# Patient Record
Sex: FEMALE | Race: WHITE | NOT HISPANIC OR LATINO | Employment: OTHER | URBAN - METROPOLITAN AREA
[De-identification: names, ages, dates, MRNs, and addresses within clinical notes are randomized per-mention and may not be internally consistent; named-entity substitution may affect disease eponyms.]

---

## 2021-05-27 ENCOUNTER — NURSING HOME VISIT (OUTPATIENT)
Dept: GERIATRICS | Facility: OTHER | Age: 86
End: 2021-05-27
Payer: MEDICARE

## 2021-05-27 DIAGNOSIS — E11.65 TYPE 2 DIABETES MELLITUS WITH HYPERGLYCEMIA, WITH LONG-TERM CURRENT USE OF INSULIN (HCC): ICD-10-CM

## 2021-05-27 DIAGNOSIS — E66.9 OBESITY WITH SERIOUS COMORBIDITY, UNSPECIFIED CLASSIFICATION, UNSPECIFIED OBESITY TYPE: ICD-10-CM

## 2021-05-27 DIAGNOSIS — E04.2 MULTIPLE THYROID NODULES: ICD-10-CM

## 2021-05-27 DIAGNOSIS — R53.81 DEBILITY: ICD-10-CM

## 2021-05-27 DIAGNOSIS — D62 ACUTE BLOOD LOSS ANEMIA: ICD-10-CM

## 2021-05-27 DIAGNOSIS — I50.32 CHRONIC DIASTOLIC CHF (CONGESTIVE HEART FAILURE) (HCC): ICD-10-CM

## 2021-05-27 DIAGNOSIS — I25.10 CORONARY ARTERY DISEASE INVOLVING NATIVE CORONARY ARTERY OF NATIVE HEART WITHOUT ANGINA PECTORIS: ICD-10-CM

## 2021-05-27 DIAGNOSIS — S72.001D CLOSED FRACTURE OF RIGHT HIP WITH ROUTINE HEALING, SUBSEQUENT ENCOUNTER: Primary | ICD-10-CM

## 2021-05-27 DIAGNOSIS — N18.30 STAGE 3 CHRONIC KIDNEY DISEASE, UNSPECIFIED WHETHER STAGE 3A OR 3B CKD (HCC): ICD-10-CM

## 2021-05-27 DIAGNOSIS — I48.91 ATRIAL FIBRILLATION, UNSPECIFIED TYPE (HCC): ICD-10-CM

## 2021-05-27 DIAGNOSIS — Z79.4 TYPE 2 DIABETES MELLITUS WITH HYPERGLYCEMIA, WITH LONG-TERM CURRENT USE OF INSULIN (HCC): ICD-10-CM

## 2021-05-27 PROBLEM — S72.001A CLOSED RIGHT HIP FRACTURE (HCC): Status: ACTIVE | Noted: 2021-05-15

## 2021-05-27 PROCEDURE — 99306 1ST NF CARE HIGH MDM 50: CPT | Performed by: FAMILY MEDICINE

## 2021-05-27 RX ORDER — OXYCODONE HYDROCHLORIDE 5 MG/1
5 TABLET ORAL EVERY 6 HOURS PRN
Qty: 30 TABLET | Refills: 0 | Status: SHIPPED | OUTPATIENT
Start: 2021-05-27 | End: 2021-07-17

## 2021-05-27 NOTE — PROGRESS NOTES
Betty [address]  History and Physical  POS: SNF-31    Records Reviewed include: [facility] records  Unable to obtain from patient due to: History obtained from patient  Additional history obtained speaking with nursing/nursing note review and medical record review    Chief Complaint/ Reason for Admission: Right hip fracture s/p ORIF, DM2 with hyperglycemia, ABLA s/p PRBC transfusion    History of Present Illness:            80year old female admitted for SNF rehab following hospitalization at [facility], [facility]  Presented following a fall with right hip fracture  Underwent ORIF per orthopedics on 5/16/21  Postoperative course complicated by acute blood loss anemia with hypovolemic shock requiring PRBC transfusion and pressor support  Hgb 9 8 when last checked inpatient; patient without dizziness or lightheadedness reported  Has CKD3 with baseline creatinine of approximately 1 00; no MED noted per lab review during hospitalization in setting of above- peak creatinine of 1 15  Continues with pain in her right hip, exacerbated with movement and working with physical therapy; relieved with rest  Additional active medical issues include chronic diastolic CHF for which patient is on lasix and metoprolol; along with CAD with recent SWETHA to LAD placed in December 2020; continues on plavix, statin  Also with DM2 on insulin; with noted hyperglycemia since SNF admission per blood glucose log review in SNF EMR  Thyroid nodules were noted incidentally on inpatient trauma imaging         Allergies  No Known Allergies    Past Medical History  Past Medical History:   Diagnosis Date    A-fib [facility])     Chronic kidney disease     Coronary artery disease     Diabetes mellitus ([facility] [address] )     Hypertension       CHF baseline [id number]%, grade 1 diastolic dysfunction (4/12/31)  CKD: Stage 3, baseline creatinine 1 00    Past Surgical History:   Procedure Laterality Date    COLON SURGERY      HIP SURGERY         Family History  Family History   Problem Relation Age of Onset    Heart attack Brother        Social History  Social History     Tobacco Use   Smoking Status Never Smoker   Smokeless Tobacco Never Used      Social History     Substance and Sexual Activity   Alcohol Use Never      Social History     Substance and Sexual Activity   Drug Use Not on file        Lives: Assisted Living,- [facility]  Social Support: Facility  Fall in the past 12 months: Yes    Physical Exam    Weight: 171 4lb Temp:97 7F BP:148/80 Pulse:79 Resp:18 O2 Sat:97% NC  Constitutional: Well-nourished, Obese and Normocephalic  Orientation:Person and Place, situation     Physical Exam  Vitals and nursing note reviewed  Constitutional:       General: She is awake  She is not in acute distress  Appearance: She is well-developed  She is obese  She is not ill-appearing, toxic-appearing or diaphoretic  HENT:      Head: Normocephalic and atraumatic  Right Ear: External ear normal       Left Ear: External ear normal       Nose: No rhinorrhea  Mouth/Throat:      Mouth: Mucous membranes are moist       Pharynx: Oropharynx is clear  Eyes:      General: No scleral icterus  Right eye: No discharge  Left eye: No discharge  Conjunctiva/sclera: Conjunctivae normal    Cardiovascular:      Rate and Rhythm: Normal rate  Rhythm irregular  Pulmonary:      Effort: Pulmonary effort is normal  No respiratory distress  Breath sounds: No wheezing  Comments: Decreased breath sounds b/l bases  Abdominal:      General: There is no distension  Palpations: Abdomen is soft  Tenderness: There is no abdominal tenderness  Musculoskeletal:         General: No deformity  Cervical back: No rigidity  Right lower le+ Edema present  Left lower le+ Edema present  Skin:     General: Skin is warm and dry  Coloration: Skin is not jaundiced or pale  Neurological:      General: No focal deficit present  Mental Status: She is alert  Cranial Nerves: No cranial nerve deficit  Psychiatric:         Attention and Perception: Attention and perception normal          Mood and Affect: Mood and affect normal          Speech: Speech normal          Behavior: Behavior normal  Behavior is cooperative  Thought Content: Thought content normal          Review of Systems:  Review of Systems   Constitutional: Negative for chills and fever  Respiratory: Negative for cough and shortness of breath  Cardiovascular: Negative for chest pain  Gastrointestinal: Negative for abdominal pain and constipation  Musculoskeletal: Positive for gait problem  Neurological: Negative for dizziness and light-headedness  Psychiatric/Behavioral: Negative for sleep disturbance  All other systems reviewed and are negative        List of Current Medications: Medication list reviewed and updated in Epic to reflect most current SNF orders    Allergies  No Known Allergies    Labs/Diagnostics (reviewed by this provider): Hospital Paperwork and Old Records  HbA1c: (5/16/21) 8 6  CBC: (5/24/21) Hb:9 8 Hct:29 1 WBC:6 8 PLT:212  BMP: (5/24/21) Na:142 K:3 8 Cl:109 CO:29 BUN:21 Cr:0 87 Glu:170 Ca:8 9  Cardiac: [id number]     Imaging Reviewed:  EKG: (5/16/21)- Afib  CT Scan: Head (5/15/21)- chronic right centrum semiovale lacunar infarct  Cervical/Thoracic/Lumbar spine (5/15/21)- no fracture; multilevel degenerative changes  Other: Xray R femur (5/17/21)- s/p intramedullary rodding of right intertrochanteric femur fracture    Assessment/Plan:  80year old female with:    Closed right hip fracture (HCC)  S/p ORIF on 5/16/21  Start scheduled acetaminophen 650mg TID for pain control  Continue OxyIR PRN- add 5mg Q6h PRN for severe pain; continue 2 5mg Q6h PRN for moderate pain  Continue eliquis for DVT prophylaxis (takes chronically as anticoagulation in setting of Afib)  Routine orthopedics follow up  Consult PT/OT- evaluate and treat    Acute blood loss anemia  In setting of above; requiring PRBC transfusion inpatient due to hypovolemic shock  Follow up CBC ordered    Type 2 diabetes mellitus, with long-term current use of insulin ([facility])    Lab Results   Component Value Date    HGBA1C 8 6 (H) 05/16/2021   With hyperglycemia per review of glucose log in SNF EMR  Patient's lantus was not restarted at time of hospital discharge- Restart lantus 20u SQ daily (previously on 30u daily outpatient)  Continue novolog with meals: 3u breakfast, 8u lunch, 14u dinner  Continue to monitor accuchecks QID    CKD (chronic kidney disease) stage 3, GFR 30-59 ml/min ([facility])  Baseline creatinine 1 00  At risk for MED in setting of above  Follow up BMP ordered    Chronic diastolic CHF (congestive heart failure) ([facility] [address] )  Examines euvolemic at time of SNF admission aside for 1+ lower extremity edema  Monitor weights and volume status closely  Continue lasix + KCl, metoprolol    Coronary artery disease involving native coronary artery  S/p SWETHA to LAD December 2020  Stable; continue plavix, metoprolol, rosuvastatin     A-fib ([facility])  Continue metoprolol; goal HR<100  Continue anticoagulation with eliquis    Multiple thyroid nodules  Noted incidentally on inpatient imaging  Outpatient follow up with PCP with recommendation for dedicated thyroid ultrasound    Obesity  Dietary/nutrition consult    Debility  Multifactorial  Admit to SNF for rehab  PT/OT consult- evaluate and treat  Supportive care, nutritional support, ADL support  Fall precautions  Management of acute and chronic medical conditions as outlined      Pain: Present yes- right hip with activity   Rehab Potential:Fair  Patient Informed of Medical Condition: yes  Patient is Capable of Understanding Their Right: yes  Prognosis:Fair  Discharge Plan: STR-> [facility]  Surrogate Decision Maker:  Advanced Directives: Yes   Code status:DNR (Per discussion with resident or POA)   Code status order updated to DNR/DNI while at SNF  PCP: Kay Archuleta, DO  5/27/21

## 2021-06-02 ENCOUNTER — NURSING HOME VISIT (OUTPATIENT)
Dept: GERIATRICS | Facility: OTHER | Age: 86
End: 2021-06-02
Payer: MEDICARE

## 2021-06-02 DIAGNOSIS — E87.6 HYPOKALEMIA: ICD-10-CM

## 2021-06-02 DIAGNOSIS — Z79.4 TYPE 2 DIABETES MELLITUS WITH HYPERGLYCEMIA, WITH LONG-TERM CURRENT USE OF INSULIN (HCC): ICD-10-CM

## 2021-06-02 DIAGNOSIS — R53.81 DEBILITY: ICD-10-CM

## 2021-06-02 DIAGNOSIS — E11.65 TYPE 2 DIABETES MELLITUS WITH HYPERGLYCEMIA, WITH LONG-TERM CURRENT USE OF INSULIN (HCC): ICD-10-CM

## 2021-06-02 DIAGNOSIS — S72.001D CLOSED FRACTURE OF RIGHT HIP WITH ROUTINE HEALING, SUBSEQUENT ENCOUNTER: Primary | ICD-10-CM

## 2021-06-02 DIAGNOSIS — D62 ACUTE BLOOD LOSS ANEMIA: ICD-10-CM

## 2021-06-02 PROCEDURE — 99309 SBSQ NF CARE MODERATE MDM 30: CPT | Performed by: FAMILY MEDICINE

## 2021-06-03 NOTE — PROGRESS NOTES
[address] [facility]  Progress Note  POS: SNF-31    Unable to obtain from patient due to: History obtained from patient along with speaking with nursing/ nursing libertad review  Chief Complaint/Reason for visit: STR follow up  History of Present Illness: 80year old female evaluated for STR follow up  Blood glucose log reviewed per [facility] EMR; improved with addition of lantus but still with hyperglycemia  Right hip pain is improved; patient using PRN OxyIR sparingly  Continues on lasix for chronic diastolic CHF, noted to have low potassium of 3 2 per labs today  Past Medical History: unchanged from history and physical  Past Medical History:   Diagnosis Date    A-fib ([facility] )     Chronic kidney disease     Coronary artery disease     Diabetes mellitus ([facility] )     Hypertension      Family History: unchanged from history and physical  Social History: unchanged from history and physical  Review of systems: Review of Systems   Constitutional: Negative for chills, fever and unexpected weight change  Respiratory: Negative for cough and shortness of breath  Gastrointestinal: Negative for constipation and diarrhea  Musculoskeletal: Negative for arthralgias  Neurological: Negative for dizziness and light-headedness  All other systems reviewed and are negative  Medications: Changes made- see written orders  Medication list reviewed and updated in Epic to reflect most current SNF orders  Allergies: NKDA  Consults reviewed:PT and OT  Labs/Diagnostics (reviewed by this provider): Copy in Chart  CBC: (6/2/21) Hb:10 0 Hct:29 9 WBC:12 6 PLT:236  BMP: (6/2/21) Na:140 K:3 2 Cl:96 CO:34 BUN:19 Cr:0 86 Glu:172 Ca:8 9    Physical Exam    Weight: 171 4lb Temp:97 2F BP:136/74  Pulse:80 Resp:20 O2 Sat:95%RA  Constitutional: Well-nourished, Obese and Normocephalic  Orientation:Person and Place     Physical Exam  Vitals and nursing note reviewed  Constitutional:       General: She is awake   She is not in acute distress  Appearance: She is well-developed and well-groomed  She is obese  She is not ill-appearing, toxic-appearing or diaphoretic  HENT:      Head: Normocephalic and atraumatic  Right Ear: External ear normal       Left Ear: External ear normal       Nose: No rhinorrhea  Mouth/Throat:      Mouth: Mucous membranes are moist       Pharynx: Oropharynx is clear  Eyes:      General: No scleral icterus  Right eye: No discharge  Left eye: No discharge  Conjunctiva/sclera: Conjunctivae normal    Pulmonary:      Effort: Pulmonary effort is normal  No respiratory distress  Breath sounds: No wheezing  Abdominal:      General: There is no distension  Palpations: Abdomen is soft  Musculoskeletal:         General: No deformity  Cervical back: No rigidity  Right lower leg: Edema present  Left lower leg: Edema present  Skin:     General: Skin is warm and dry  Coloration: Skin is not jaundiced or pale  Neurological:      General: No focal deficit present  Mental Status: She is alert  Mental status is at baseline  Cranial Nerves: No cranial nerve deficit  Psychiatric:         Attention and Perception: Attention and perception normal          Mood and Affect: Mood and affect normal          Speech: Speech normal          Behavior: Behavior normal  Behavior is cooperative         Assessment/Plan:  80year old female with:    Closed right hip fracture ([address] )  S/p ORIF on 5/16/21  Continue scheduled acetaminophen, PRN OxyIR for pain control  Continue eliquis for DVT prophylaxis  Routine orthopedics follow up  Continue PT/OT    Type 2 diabetes mellitus, with long-term current use of insulin (HCC)    Lab Results   Component Value Date    HGBA1C 8 6 (H) 05/16/2021   With continued hyperglycemia per review of glucose log in SNF EMR  Increase lantus to 22u SQ daily (previously on 30u daily outpatient)  Continue novolog with meals: 3u breakfast, 8u lunch, 14u dinner  Continue to monitor accuchecks QID    Hypokalemia  Supplementation with KCl 20meq daily  Order repeat BMP along with magnesium level    Acute blood loss anemia  Hgb stable/improved at 10 0  Mild leukocytosis noted- repeat CBC ordered    Debility  Multifactorial  Continue SNF rehab  Continue PT/OT   Supportive care, nutritional support, ADL support  Fall precautions  Management of acute and chronic medical conditions as outlined    Estella De Anda, DO  6/2/21

## 2021-06-09 ENCOUNTER — NURSING HOME VISIT (OUTPATIENT)
Dept: GERIATRICS | Facility: OTHER | Age: 86
End: 2021-06-09
Payer: MEDICARE

## 2021-06-09 DIAGNOSIS — E04.2 MULTIPLE THYROID NODULES: ICD-10-CM

## 2021-06-09 DIAGNOSIS — N18.30 STAGE 3 CHRONIC KIDNEY DISEASE, UNSPECIFIED WHETHER STAGE 3A OR 3B CKD (HCC): ICD-10-CM

## 2021-06-09 DIAGNOSIS — E11.65 TYPE 2 DIABETES MELLITUS WITH HYPERGLYCEMIA, WITH LONG-TERM CURRENT USE OF INSULIN (HCC): ICD-10-CM

## 2021-06-09 DIAGNOSIS — I25.10 CORONARY ARTERY DISEASE INVOLVING NATIVE CORONARY ARTERY OF NATIVE HEART WITHOUT ANGINA PECTORIS: ICD-10-CM

## 2021-06-09 DIAGNOSIS — Z79.4 TYPE 2 DIABETES MELLITUS WITH HYPERGLYCEMIA, WITH LONG-TERM CURRENT USE OF INSULIN (HCC): ICD-10-CM

## 2021-06-09 DIAGNOSIS — D62 ACUTE BLOOD LOSS ANEMIA: ICD-10-CM

## 2021-06-09 DIAGNOSIS — S72.001D CLOSED FRACTURE OF RIGHT HIP WITH ROUTINE HEALING, SUBSEQUENT ENCOUNTER: Primary | ICD-10-CM

## 2021-06-09 DIAGNOSIS — E83.42 HYPOMAGNESEMIA: ICD-10-CM

## 2021-06-09 DIAGNOSIS — I50.32 CHRONIC DIASTOLIC CHF (CONGESTIVE HEART FAILURE) (HCC): ICD-10-CM

## 2021-06-09 DIAGNOSIS — R53.81 DEBILITY: ICD-10-CM

## 2021-06-09 DIAGNOSIS — I48.91 ATRIAL FIBRILLATION, UNSPECIFIED TYPE (HCC): ICD-10-CM

## 2021-06-09 PROCEDURE — 99316 NF DSCHRG MGMT 30 MIN+: CPT | Performed by: FAMILY MEDICINE

## 2021-06-10 PROBLEM — E11.9 TYPE 2 DIABETES MELLITUS, WITH LONG-TERM CURRENT USE OF INSULIN (HCC): Status: ACTIVE | Noted: 2021-05-15

## 2021-06-10 PROBLEM — Z79.4 TYPE 2 DIABETES MELLITUS, WITH LONG-TERM CURRENT USE OF INSULIN (HCC): Status: ACTIVE | Noted: 2021-05-15

## 2021-06-10 PROBLEM — I25.10 CORONARY ARTERY DISEASE INVOLVING NATIVE CORONARY ARTERY: Status: ACTIVE | Noted: 2020-12-29

## 2021-06-10 PROBLEM — K21.9 GERD (GASTROESOPHAGEAL REFLUX DISEASE): Status: ACTIVE | Noted: 2021-05-15

## 2021-06-10 PROBLEM — I50.22 CHRONIC SYSTOLIC CHF (CONGESTIVE HEART FAILURE) (HCC): Status: ACTIVE | Noted: 2020-12-29

## 2021-06-10 PROBLEM — I48.91 A-FIB (HCC): Status: ACTIVE | Noted: 2021-05-15

## 2021-06-10 PROBLEM — N18.9 CKD (CHRONIC KIDNEY DISEASE): Status: ACTIVE | Noted: 2021-05-15

## 2021-06-10 NOTE — PROGRESS NOTES
[facility]  [address]  [phone]  [address] [place] [address]: [facility]  POS: 31: SNF/Short Term Rehab    NAME: Bijan Mclain  AGE: 80 y o  SEX: female  DATE OF ADMISSION: 5/25/21 DATE OF DISCHARGE:6/11/21 DISCHARGE DISPOSITION: [facility]    Reason for admission: Patient was admitted from [facility] for rehabilitation after hospitalization for right hip fracture s/p ORIF      Admission Diagnoses:   Closed right hip fracture ([address] )  S/p ORIF on 5/16/21  Continue scheduled acetaminophen, PRN OxyIR for pain control  Continue eliquis for DVT prophylaxis  Routine orthopedics follow up  Continue PT/OT at discharge; scripts faxed to [facility]    Hypomagnesemia  With associated hypokalemia- improved on supplementation with 20meq daily  Start magnesium oxide 250mg daily    Acute blood loss anemia  In setting of right hip fracture; requiring PRBC transfusion inpatient due to hypovolemic shock  CBC reviewed/improved (see below); recommend to recheck in 2 weeks outpatient    Type 2 diabetes mellitus, with long-term current use of insulin ([facility])    Lab Results   Component Value Date    HGBA1C 8 6 (H) 05/16/2021   Continue lantus 22u SQ daily (previously on 30u daily outpatient)  Continue novolog with meals: 3u breakfast, 8u lunch, 14u dinner  Continue to monitor accuchecks QID  Tradjenta discontinued    Chronic diastolic CHF (congestive heart failure) ([facility])  Examines euvolemic overall at time of SNF discharge exam  Continue to monitor weights and volume status closely  Continue lasix + KCl, metoprolol    A-fib ([facility])  Continue metoprolol; goal HR<100  Continue anticoagulation with eliquis    CKD (chronic kidney disease) stage 3, GFR 30-59 ml/min ([facility])  Baseline creatinine 1 00  Repeat BMP reviewed/stable (see below)    Multiple thyroid nodules  Noted incidentally on inpatient imaging  Outpatient follow up with PCP with recommendation for dedicated thyroid ultrasound    Coronary artery disease involving native coronary artery  S/p SWETHA to LAD December 2020  Stable; continue plavix, metoprolol, rosuvastatin     Debility  Multifactorial  Completed SNF rehab  Continue PT/OT through home health at discharge  Supportive care, nutritional support, ADL support  Fall precautions  Management of acute and chronic medical conditions as outlined    Additional Problems: See above  Discharge Diagnoses: See above    Course of stay: Patient was admitted [facility] due to right hip fracture s/p ORIF, ABLA  Significant events during the stay include: hypokalemia with associated hypomagnesemia- management as above; also with hyperglycemia, improved with management as above  The patient participated in PT/OT  Labs and testing performed during stay:  BMP (6/7/21): Na 140 K 3 5 Cl 101 CO2 32 BUN 22 Creat 0 83 Glc 95 Ca 9 2 Mg 1 1  CBC (6/7/21): Hgb 11 4 Hct 34 8 WBC 6 3 Plt 248    New Medications:   -OxyIR 2 5mg Q6h PRN moderate pain, 5mg Q6h PRN severe pain  -Acetaminophen 650mg TID  -Magnesium oxide 250mg daily  Medication Changes:   -Lantus adjusted to 22u QHS  Discontinued Medications:  -Tradjenta  Discharge Medications: See discharge medication list which was reviewed and updated in Epic to reflect most current SNF orders  Status at time of discharge: Stable    Today's Visit: 6/9/21  Subjective:Patient evaluated for STR follow up and discharge visit; scheduled to return to [facility] on 6/11/21  Patient's right hip pain has improved; with intermittent use of PRN OxyIR with good relief  Blood sugars reviewed per SNF EMR and improved overall  Potassium level improved to 3 5; patient noted to have low Mg level of 1 1  Vitals: 171 4lb 76 136/74 97 2F 20    Exam: Physical Exam  Vitals and nursing note reviewed  Constitutional:       General: She is awake  She is not in acute distress       Appearance: She is well-developed and well-groomed  She is obese  She is not ill-appearing, toxic-appearing or diaphoretic  HENT:      Head: Normocephalic and atraumatic  Right Ear: External ear normal       Left Ear: External ear normal       Nose: No rhinorrhea  Mouth/Throat:      Mouth: Mucous membranes are moist       Pharynx: Oropharynx is clear  Eyes:      General: No scleral icterus  Right eye: No discharge  Left eye: No discharge  Conjunctiva/sclera: Conjunctivae normal    Pulmonary:      Effort: Pulmonary effort is normal  No respiratory distress  Abdominal:      General: There is no distension  Palpations: Abdomen is soft  Musculoskeletal:         General: No deformity  Cervical back: No rigidity  Right lower leg: No edema  Left lower leg: No edema  Skin:     General: Skin is warm and dry  Coloration: Skin is not jaundiced or pale  Neurological:      General: No focal deficit present  Mental Status: She is alert  Mental status is at baseline  Cranial Nerves: No cranial nerve deficit  Psychiatric:         Attention and Perception: Attention and perception normal          Mood and Affect: Mood and affect normal          Speech: Speech normal          Behavior: Behavior normal  Behavior is cooperative  Thought Content: Thought content normal          Discussion with patient/family and further instructions:  -Fall precautions  -Bleeding precautions  -Medication list was reviewed and updated in Epic to reflect most current SNF orders  -Scripts for PT/OT were faxed to facility    Follow-up Recommendations: Please follow-up with your primary care physician within 7-10 days of discharge to review medication changes and current status  Discharge summary forwarded to PCP through Epic      Problem List Follow-up Recommendations:  -See above    I have spent 35 minutes with Patient  today in which greater than 50% of this time was spent in counseling/coordination of care regarding Diagnostic results, Intructions for management, Patient and family education, Risk factor reductions, Impressions and coordination of discharge care with nursing and social work      [address] [facility], DO  6/9/21

## 2021-06-11 RX ORDER — POTASSIUM CHLORIDE 1.5 G/1.77G
20 POWDER, FOR SOLUTION ORAL DAILY
COMMUNITY
End: 2021-07-17

## 2021-06-11 RX ORDER — PANTOPRAZOLE SODIUM 40 MG/1
40 TABLET, DELAYED RELEASE ORAL DAILY
COMMUNITY
Start: 2021-05-26 | End: 2022-05-26

## 2021-06-11 RX ORDER — AMOXICILLIN 250 MG
1 CAPSULE ORAL 2 TIMES DAILY
COMMUNITY
Start: 2021-05-25 | End: 2022-05-25

## 2021-06-11 RX ORDER — FUROSEMIDE 40 MG/1
40 TABLET ORAL DAILY
COMMUNITY
Start: 2020-12-25 | End: 2022-05-25

## 2021-06-11 RX ORDER — ACETAMINOPHEN 500 MG
500 TABLET ORAL 3 TIMES DAILY
COMMUNITY

## 2021-06-11 RX ORDER — ROSUVASTATIN CALCIUM 20 MG/1
20 TABLET, COATED ORAL DAILY
COMMUNITY
Start: 2020-12-24 | End: 2022-05-25

## 2021-06-11 RX ORDER — CLOPIDOGREL BISULFATE 75 MG/1
75 TABLET ORAL DAILY
COMMUNITY
Start: 2020-12-25

## 2021-06-11 RX ORDER — METOPROLOL SUCCINATE 25 MG/1
75 TABLET, EXTENDED RELEASE ORAL DAILY
COMMUNITY
Start: 2020-12-25 | End: 2022-05-25

## 2021-06-11 RX ORDER — INSULIN ASPART 100 [IU]/ML
3-10 INJECTION, SOLUTION INTRAVENOUS; SUBCUTANEOUS
COMMUNITY

## 2021-06-17 PROBLEM — E83.42 HYPOMAGNESEMIA: Status: ACTIVE | Noted: 2021-06-17

## 2021-06-17 PROBLEM — N18.30 CKD (CHRONIC KIDNEY DISEASE) STAGE 3, GFR 30-59 ML/MIN (HCC): Status: ACTIVE | Noted: 2021-05-15

## 2021-06-17 PROBLEM — R53.81 DEBILITY: Status: ACTIVE | Noted: 2021-06-17

## 2021-06-17 PROBLEM — E66.9 OBESITY: Status: ACTIVE | Noted: 2021-06-17

## 2021-06-17 PROBLEM — D62 ACUTE BLOOD LOSS ANEMIA: Status: ACTIVE | Noted: 2021-06-17

## 2021-06-17 PROBLEM — E87.6 HYPOKALEMIA: Status: ACTIVE | Noted: 2021-06-17

## 2021-06-17 PROBLEM — I50.32 CHRONIC DIASTOLIC CHF (CONGESTIVE HEART FAILURE) (HCC): Status: ACTIVE | Noted: 2020-12-29

## 2021-06-17 PROBLEM — E04.2 MULTIPLE THYROID NODULES: Status: ACTIVE | Noted: 2021-06-17

## 2021-06-17 RX ORDER — MULTIVITAMIN/IRON/FOLIC ACID 18MG-0.4MG
250 TABLET ORAL DAILY
Refills: 0
Start: 2021-06-17

## 2021-06-17 NOTE — ASSESSMENT & PLAN NOTE
With associated hypokalemia- improved on supplementation with 20meq daily  Start magnesium oxide 250mg daily

## 2021-06-17 NOTE — ASSESSMENT & PLAN NOTE
Examines euvolemic at time of SNF admission aside for 1+ lower extremity edema  Monitor weights and volume status closely  Continue lasix + KCl, metoprolol

## 2021-06-17 NOTE — ASSESSMENT & PLAN NOTE
Lab Results   Component Value Date    HGBA1C 8 6 (H) 05/16/2021   With continued hyperglycemia per review of glucose log in SNF EMR  Increase lantus to 22u SQ daily (previously on 30u daily outpatient)  Continue novolog with meals: 3u breakfast, 8u lunch, 14u dinner  Continue to monitor accuchecks QID

## 2021-06-17 NOTE — ASSESSMENT & PLAN NOTE
Multifactorial  Continue SNF rehab  Continue PT/OT   Supportive care, nutritional support, ADL support  Fall precautions  Management of acute and chronic medical conditions as outlined

## 2021-06-17 NOTE — ASSESSMENT & PLAN NOTE
In setting of right hip fracture; requiring PRBC transfusion inpatient due to hypovolemic shock  CBC reviewed/improved (see below); recommend to recheck in 2 weeks outpatient

## 2021-06-17 NOTE — ASSESSMENT & PLAN NOTE
Examines euvolemic overall at time of SNF discharge exam  Continue to monitor weights and volume status closely  Continue lasix + KCl, metoprolol

## 2021-06-17 NOTE — ASSESSMENT & PLAN NOTE
Lab Results   Component Value Date    HGBA1C 8 6 (H) 05/16/2021   Continue lantus 22u SQ daily (previously on 30u daily outpatient)  Continue novolog with meals: 3u breakfast, 8u lunch, 14u dinner  Continue to monitor accuchecks QID  Tradjenta discontinued

## 2021-06-17 NOTE — ASSESSMENT & PLAN NOTE
S/p ORIF on 5/16/21  Continue scheduled acetaminophen, PRN OxyIR for pain control  Continue eliquis for DVT prophylaxis  Routine orthopedics follow up  Continue PT/OT at discharge; scripts faxed to [facility]

## 2021-06-17 NOTE — ASSESSMENT & PLAN NOTE
Multifactorial  Completed SNF rehab  Continue PT/OT through home health at discharge  Supportive care, nutritional support, ADL support  Fall precautions  Management of acute and chronic medical conditions as outlined

## 2021-06-17 NOTE — ASSESSMENT & PLAN NOTE
Noted incidentally on inpatient imaging  Outpatient follow up with PCP with recommendation for dedicated thyroid ultrasound

## 2021-06-17 NOTE — ASSESSMENT & PLAN NOTE
Lab Results   Component Value Date    HGBA1C 8 6 (H) 05/16/2021   With hyperglycemia per review of glucose log in SNF EMR  Patient's lantus was not restarted at time of hospital discharge- Restart lantus 20u SQ daily (previously on 30u daily outpatient)  Continue novolog with meals: 3u breakfast, 8u lunch, 14u dinner  Continue to monitor accuchecks QID

## 2021-06-17 NOTE — ASSESSMENT & PLAN NOTE
S/p ORIF on 5/16/21  Start scheduled acetaminophen 650mg TID for pain control  Continue OxyIR PRN- add 5mg Q6h PRN for severe pain; continue 2 5mg Q6h PRN for moderate pain  Continue eliquis for DVT prophylaxis (takes chronically as anticoagulation in setting of Afib)  Routine orthopedics follow up  Consult PT/OT- evaluate and treat

## 2021-06-17 NOTE — ASSESSMENT & PLAN NOTE
S/p ORIF on 5/16/21  Continue scheduled acetaminophen, PRN OxyIR for pain control  Continue eliquis for DVT prophylaxis  Routine orthopedics follow up  Continue PT/OT

## 2021-06-17 NOTE — ASSESSMENT & PLAN NOTE
In setting of above; requiring PRBC transfusion inpatient due to hypovolemic shock  Follow up CBC ordered

## 2021-06-24 ENCOUNTER — TELEPHONE (OUTPATIENT)
Dept: GERIATRICS | Age: 86
End: 2021-06-24

## 2021-06-24 NOTE — TELEPHONE ENCOUNTER
Spoke w/Genny at [facility] and she did not have insurance info, then she referred me to called [phone] spoke with Raven Junior on  06/17 and 06/21  I requested insurance information and she never sent it to me so I am self pay so patient or family will call to provide the insurance info

## 2021-07-07 ENCOUNTER — NURSING HOME VISIT (OUTPATIENT)
Dept: GERIATRICS | Facility: OTHER | Age: 86
End: 2021-07-07
Payer: MEDICARE

## 2021-07-07 DIAGNOSIS — I48.91 ATRIAL FIBRILLATION, UNSPECIFIED TYPE (HCC): ICD-10-CM

## 2021-07-07 DIAGNOSIS — N18.30 STAGE 3 CHRONIC KIDNEY DISEASE, UNSPECIFIED WHETHER STAGE 3A OR 3B CKD (HCC): ICD-10-CM

## 2021-07-07 DIAGNOSIS — E83.42 HYPOMAGNESEMIA: ICD-10-CM

## 2021-07-07 DIAGNOSIS — S72.001D CLOSED FRACTURE OF RIGHT HIP WITH ROUTINE HEALING, SUBSEQUENT ENCOUNTER: ICD-10-CM

## 2021-07-07 DIAGNOSIS — Z79.4 TYPE 2 DIABETES MELLITUS WITH HYPERGLYCEMIA, WITH LONG-TERM CURRENT USE OF INSULIN (HCC): ICD-10-CM

## 2021-07-07 DIAGNOSIS — D62 ACUTE BLOOD LOSS ANEMIA: ICD-10-CM

## 2021-07-07 DIAGNOSIS — I50.32 CHRONIC DIASTOLIC CHF (CONGESTIVE HEART FAILURE) (HCC): ICD-10-CM

## 2021-07-07 DIAGNOSIS — E11.65 TYPE 2 DIABETES MELLITUS WITH HYPERGLYCEMIA, WITH LONG-TERM CURRENT USE OF INSULIN (HCC): ICD-10-CM

## 2021-07-07 DIAGNOSIS — R53.81 DEBILITY: ICD-10-CM

## 2021-07-07 DIAGNOSIS — I25.10 CORONARY ARTERY DISEASE INVOLVING NATIVE CORONARY ARTERY OF NATIVE HEART WITHOUT ANGINA PECTORIS: ICD-10-CM

## 2021-07-07 DIAGNOSIS — T81.49XA POSTOPERATIVE WOUND INFECTION OF RIGHT HIP: Primary | ICD-10-CM

## 2021-07-07 PROCEDURE — 99306 1ST NF CARE HIGH MDM 50: CPT | Performed by: FAMILY MEDICINE

## 2021-07-07 RX ORDER — LOPERAMIDE HYDROCHLORIDE 2 MG/1
2 TABLET ORAL 4 TIMES DAILY PRN
COMMUNITY

## 2021-07-07 RX ORDER — DAPTOMYCIN 50 MG/ML
550 INJECTION, POWDER, LYOPHILIZED, FOR SOLUTION INTRAVENOUS EVERY 24 HOURS
COMMUNITY
Start: 2021-06-26 | End: 2021-07-24

## 2021-07-07 RX ORDER — POTASSIUM CHLORIDE 20 MEQ/1
20 TABLET, EXTENDED RELEASE ORAL DAILY
COMMUNITY

## 2021-07-07 RX ORDER — TRAMADOL HYDROCHLORIDE 50 MG/1
50 TABLET ORAL 2 TIMES DAILY PRN
Qty: 20 TABLET | Refills: 0 | Status: SHIPPED | OUTPATIENT
Start: 2021-07-07 | End: 2021-08-06

## 2021-07-07 RX ORDER — ACETAMINOPHEN 500 MG
1000 TABLET ORAL EVERY 8 HOURS
COMMUNITY
Start: 2021-06-19 | End: 2021-07-19

## 2021-07-07 RX ORDER — TRAMADOL HYDROCHLORIDE 50 MG/1
50 TABLET ORAL 2 TIMES DAILY PRN
COMMUNITY
Start: 2021-06-19 | End: 2021-07-07 | Stop reason: SDUPTHER

## 2021-07-08 NOTE — PROGRESS NOTES
[facility]  History and Physical  POS: SNF-31    Records Reviewed include: [facility] records  Unable to obtain from patient due to: History obtained from patient along with speaking with nursing/ nursing note review and medical record review    Chief Complaint/ Reason for Admission: Right hip postoperative wound infection    History of Present Illness:            80year old female admitted for SNF rehab following hospitalization at [facility] for postoperative wound infection of right hip; in setting of recent surgical repair for right hip fracture in May 2021  Imaging reveal fluid collection; evaluated by orthopedics and underwent washout; fluid cultures positive for staph aureus  Evaluated by infectious disease; ultimately placed on daptomycin IV to continue for a total of 4 weeks  Patient notes continued pain in her right hip; current pain regimen includes scheduled acetaminophen and PRN tramadol  Continues on lantus, novolog in addition to tradjenta for DM2; with hyperglycemia noted since SNF admission with sugars >400 noted per [facility] blood glucose log review  Continues on lasix +KCl, metoprolol, lisinopril for chronic diastolic CHF; no shortness of breath or increase in lower extremity edema reported  Previous hypokalemia and hypomagnesemia improved with oral supplementation         Allergies  No Known Allergies    Past Medical History  Past Medical History:   Diagnosis Date    [facility]-fib ([facility] )     Chronic kidney disease     Coronary artery disease     Diabetes mellitus ([facility] )     Hypertension       CHF baseline LB:99-38%, grade 1 diastolic dysfunction (7/7151)  CKD: Stage 3, baseline creatinine 1 00    Past Surgical History:   Procedure Laterality Date    COLON SURGERY      HIP SURGERY         Family History  Family History   Problem Relation Age of Onset    Heart attack Brother        Social History  Social History     Tobacco Use   Smoking Status Never Smoker   Smokeless Tobacco Never Used      Social History     Substance and Sexual Activity   Alcohol Use Never      Social History     Substance and Sexual Activity   Drug Use Not on file        Lives: Assisted Living,- [facility]  Social Support: Facility  Fall in the past 12 months: Yes    Physical Exam    Weight: 172lb Temp:96 9F BP:144/72 Pulse:80 Resp:18 O2 Sat:96%RA  Constitutional: Well-nourished, Obese and Normocephalic  Orientation:Person and Place, situation     Physical Exam  Vitals and nursing note reviewed  Constitutional:       General: She is awake  She is not in acute distress  Appearance: She is well-developed and well-groomed  She is obese  She is not ill-appearing, toxic-appearing or diaphoretic  HENT:      Head: Normocephalic and atraumatic  Right Ear: External ear normal       Left Ear: External ear normal       Nose: No rhinorrhea  Mouth/Throat:      Mouth: Mucous membranes are moist       Pharynx: Oropharynx is clear  Eyes:      General: No scleral icterus  Right eye: No discharge  Left eye: No discharge  Conjunctiva/sclera: Conjunctivae normal    Cardiovascular:      Rate and Rhythm: Normal rate  Rhythm irregular  Heart sounds: S1 normal and S2 normal       Comments: LUE PICC in place  Pulmonary:      Effort: Pulmonary effort is normal  No respiratory distress  Abdominal:      General: There is no distension  Palpations: Abdomen is soft  Musculoskeletal:         General: No deformity  Cervical back: No rigidity  Right lower leg: Edema present  Left lower leg: Edema present  Comments: Compression stockings in place b/l LEs   Skin:     General: Skin is warm and dry  Coloration: Skin is not jaundiced or pale  Neurological:      Mental Status: She is alert  Mental status is at baseline  Cranial Nerves: No dysarthria or facial asymmetry     Psychiatric:         Attention and Perception: Attention and perception normal          Mood and Affect: Mood and affect normal          Speech: Speech normal          Behavior: Behavior is cooperative  Review of Systems:  Review of Systems   Constitutional: Negative for chills and fever  Respiratory: Negative for cough and shortness of breath  Cardiovascular: Negative for leg swelling  Gastrointestinal: Negative for abdominal pain  Musculoskeletal: Positive for arthralgias  Skin: Positive for wound  Neurological: Negative for dizziness and light-headedness  All other systems reviewed and are negative        List of Current Medications: Medication list reviewed and updated in Epic to reflect most current SNF orders    Allergies  No Known Allergies    Labs/Diagnostics (reviewed by this provider): Hospital Paperwork and Old Records  CBC: (21) Hb:10 9 Hct:33 6 WBC:5 7 PLT:225  BMP: (21) Cr:1 13 Ma 4    Other: CK (21): 28 (83)    Microbiology:  Culture R hip (21)- Staph aureus    Imaging Reviewed:  CT Scan: R hip (21)- subcutaneous fluid density lateral aspect of hip    Assessment/Plan:  80year old female with:    Postoperative wound infection of right hip  Continue daptomycin; antibiotic management per ID, routine follow up as scheduled  Wound cultures inpatient +staph aureus  Weekly labs ordered while on IV antibiotics; monitor renal function closely    CKD (chronic kidney disease) stage 3, GFR 30-59 ml/min ([facility])  Baseline creatinine 1 00  Monitor creatinine weekly while on IV antibiotics, labs ordered    Acute blood loss anemia  In setting of recent right hip fracture s/p surgical repair  CBC ordered on admission reviewed and stable  Continue ferrous sulfate    Chronic diastolic CHF (congestive heart failure) ([facility])  Examines euvolemic overall at time of SNF admission exam  Continue to monitor weights and volume status closely  Continue lasix + KCl, metoprolol, lisinopril    Type 2 diabetes mellitus, with long-term current use of insulin (HCC)    Lab Results   Component Value Date    HGBA1C 8 6 (H) 05/16/2021   Continue accuchecks QID  With hyperglycemia since SNF admission, will add lantus 10u SQ QAM; continue lantus 30u QHS  Continue Novolog 3-6-10-0  Continue Tradjenta 5mg daily    Coronary artery disease involving native coronary artery  S/p SWETHA to LAD December 2020  Stable; continue aspirin, plavix, metoprolol, rosuvastatin     A-fib (HCC)  Continue metoprolol; goal HR<100  Continue anticoagulation with eliquis    Closed right hip fracture (HCC)  S/p ORIF on 5/16/21  With postoperative wound infection; management as above  Continue scheduled acetaminophen, PRN tramadol- plan to d/c if not using    Debility  Multifactorial  Admit to SNF for rehab  PT/OT consult- evaluate and treat  Supportive care, nutritional support, ADL support  Fall precautions  Management of acute and chronic medical conditions as outlined    Hypomagnesemia  Improved; most recent Mg 1 4  Continue magnesium oxide 250mg daily    Pain: Present yes- Right hip  Rehab Potential:Fair  Patient Informed of Medical Condition: yes  Patient is Capable of Understanding Their Right: yes  Prognosis:Fair  Discharge Plan: STR-> [facility]  Surrogate Decision Maker:  Advanced Directives: Yes  Code status:Full Code  PCP: Blaine Nieto MD    Immunization History   Administered Date(s) Administered    INFLUENZA 12/07/2005, 11/17/2006, 12/04/2007, 12/15/2008, 10/28/2009, 11/29/2011, 11/05/2012, 11/12/2013, 11/06/2014     Megha Nunez DO  7/7/21

## 2021-07-17 PROBLEM — T81.49XA POSTOPERATIVE WOUND INFECTION OF RIGHT HIP: Status: ACTIVE | Noted: 2021-06-24

## 2021-07-17 RX ORDER — FERROUS SULFATE 325(65) MG
325 TABLET ORAL
COMMUNITY

## 2021-07-17 RX ORDER — LISINOPRIL 5 MG/1
5 TABLET ORAL DAILY
COMMUNITY

## 2021-07-17 RX ORDER — CALCIUM CARBONATE/VITAMIN D3 600 MG-10
1 TABLET ORAL DAILY
COMMUNITY

## 2021-07-17 RX ORDER — MULTIVITAMIN
1 TABLET ORAL DAILY
COMMUNITY

## 2021-07-17 RX ORDER — ECHINACEA PURPUREA EXTRACT 125 MG
1 TABLET ORAL EVERY 4 HOURS PRN
COMMUNITY

## 2021-07-17 RX ORDER — ACETAMINOPHEN 160 MG
2000 TABLET,DISINTEGRATING ORAL DAILY
COMMUNITY

## 2021-07-17 RX ORDER — LORAZEPAM 0.5 MG/1
0.5 TABLET ORAL EVERY 6 HOURS PRN
COMMUNITY
End: 2021-08-06

## 2021-07-17 RX ORDER — ASPIRIN 81 MG/1
81 TABLET, CHEWABLE ORAL DAILY
COMMUNITY

## 2021-07-17 RX ORDER — FLUTICASONE PROPIONATE 50 MCG
1 SPRAY, SUSPENSION (ML) NASAL DAILY
COMMUNITY

## 2021-07-17 NOTE — ASSESSMENT & PLAN NOTE
Lab Results   Component Value Date    HGBA1C 8 6 (H) 05/16/2021   Continue accuchecks QID  With hyperglycemia since SNF admission, will add lantus 10u SQ QAM; continue lantus 30u QHS  Continue Novolog 3-6-10-0  Continue Tradjenta 5mg daily

## 2021-07-17 NOTE — ASSESSMENT & PLAN NOTE
Examines euvolemic overall at time of SNF admission exam  Continue to monitor weights and volume status closely  Continue lasix + KCl, metoprolol, lisinopril

## 2021-07-17 NOTE — ASSESSMENT & PLAN NOTE
S/p ORIF on 5/16/21  With postoperative wound infection; management as above  Continue scheduled acetaminophen, PRN tramadol- plan to d/c if not using

## 2021-07-17 NOTE — ASSESSMENT & PLAN NOTE
Continue daptomycin; antibiotic management per ID, routine follow up as scheduled  Wound cultures inpatient +staph aureus  Weekly labs ordered while on IV antibiotics; monitor renal function closely

## 2021-07-17 NOTE — ASSESSMENT & PLAN NOTE
In setting of recent right hip fracture s/p surgical repair  CBC ordered on admission reviewed and stable  Continue ferrous sulfate

## 2021-07-21 ENCOUNTER — NURSING HOME VISIT (OUTPATIENT)
Dept: GERIATRICS | Facility: OTHER | Age: 86
End: 2021-07-21
Payer: MEDICARE

## 2021-07-21 DIAGNOSIS — R53.81 DEBILITY: ICD-10-CM

## 2021-07-21 DIAGNOSIS — N18.30 STAGE 3 CHRONIC KIDNEY DISEASE, UNSPECIFIED WHETHER STAGE 3A OR 3B CKD (HCC): ICD-10-CM

## 2021-07-21 DIAGNOSIS — Z79.4 TYPE 2 DIABETES MELLITUS WITH HYPERGLYCEMIA, WITH LONG-TERM CURRENT USE OF INSULIN (HCC): ICD-10-CM

## 2021-07-21 DIAGNOSIS — T81.49XA POSTOPERATIVE WOUND INFECTION OF RIGHT HIP: Primary | ICD-10-CM

## 2021-07-21 DIAGNOSIS — E11.65 TYPE 2 DIABETES MELLITUS WITH HYPERGLYCEMIA, WITH LONG-TERM CURRENT USE OF INSULIN (HCC): ICD-10-CM

## 2021-07-21 PROCEDURE — 99309 SBSQ NF CARE MODERATE MDM 30: CPT | Performed by: FAMILY MEDICINE

## 2021-07-21 NOTE — PROGRESS NOTES
[facility]  Progress Note  POS: SNF-31    Unable to obtain from patient due to: History obtained from patient along with speaking with nursing/ nursing note review  Chief Complaint/Reason for visit: STR follow up  History of Present Illness: 80year old female evaluated for STR follow up  Continues on IV antibiotic for hip infection; no fever or chills  Labs stable  Pain well controlled overall with scheduled acetaminophen; has used PRN tramadol 3 times over the past week  Noted to have hyperglycemia, continues on tradjenta, lantus BID and mealtime insulin; glucose trendin/19: [id number]; : [id number]; : 141-  Past Medical History: unchanged from history and physical  Family History: unchanged from history and physical  Social History: unchanged from history and physical  Review of systems: Review of Systems   Constitutional: Negative for chills and fever  Respiratory: Negative for cough and shortness of breath  Cardiovascular: Negative for leg swelling  Gastrointestinal: Negative for abdominal pain  Musculoskeletal: Positive for arthralgias and gait problem  Psychiatric/Behavioral: Negative for confusion  All other systems reviewed and are negative  Medications: Changes made- see written orders  Allergies: NKDA  Consults reviewed:PT and OT  Labs/Diagnostics (reviewed by this provider): Copy in Chart  CBC: (21)  BMP: (21)    Physical Exam    Weight: 160lb Temp:97 3F BP:128/56 Pulse:88 Resp:20 O2 Sat:95%RA  Constitutional: Well-nourished and Normocephalic  Orientation:Person and Place, situation     Physical Exam  Vitals and nursing note reviewed  Constitutional:       General: She is awake  She is not in acute distress  Appearance: She is well-developed and well-groomed  She is not ill-appearing, toxic-appearing or diaphoretic  HENT:      Head: Normocephalic and atraumatic        Right Ear: External ear normal  Left Ear: External ear normal       Nose: No rhinorrhea  Mouth/Throat:      Mouth: Mucous membranes are moist       Pharynx: Oropharynx is clear  Eyes:      General: No scleral icterus  Right eye: No discharge  Left eye: No discharge  Conjunctiva/sclera: Conjunctivae normal    Pulmonary:      Effort: Pulmonary effort is normal  No respiratory distress  Abdominal:      General: There is no distension  Palpations: Abdomen is soft  Tenderness: There is no abdominal tenderness  Musculoskeletal:         General: No deformity  Cervical back: No rigidity  Right lower leg: Edema present  Left lower leg: Edema present  Skin:     General: Skin is warm and dry  Coloration: Skin is not jaundiced or pale  Neurological:      Mental Status: She is alert  Mental status is at baseline  Cranial Nerves: No dysarthria or facial asymmetry  Psychiatric:         Attention and Perception: Attention and perception normal          Mood and Affect: Mood and affect normal          Speech: Speech normal          Behavior: Behavior is cooperative         Assessment/Plan:  80year old female with:    Postoperative wound infection of right hip  Continue daptomycin; antibiotic management per ID, routine follow up as scheduled on 7/28  Wound cultures inpatient +staph aureus  Continue weekly labs while on IV antibiotics; monitor renal function closely  Continue scheduled acetaminophen 500mg TID for pain control    Type 2 diabetes mellitus, with long-term current use of insulin ([facility])  Most recent A1c 8 6  Continue accuchecks QID  With continued hyperglycemia increase AM lantus to 15u; continue lantus 30u QHS  Continue Novolog 3-6-10-0  Continue Tradjenta 5mg daily    CKD (chronic kidney disease) stage 3, GFR 30-59 ml/min ([facility])  Baseline creatinine 1 00  Continue to monitor creatinine weekly while on IV antibiotics; creatinine 7/19 at baseline    Debility  Multifactorial  Continue SNF rehab  Continue PT/OT  Supportive care, nutritional support, ADL support  Fall precautions  Management of acute and chronic medical conditions as outlined    Tahir Jackson, DO  7/21/21

## 2021-07-28 ENCOUNTER — NURSING HOME VISIT (OUTPATIENT)
Dept: GERIATRICS | Facility: OTHER | Age: 86
End: 2021-07-28
Payer: MEDICARE

## 2021-07-28 DIAGNOSIS — R53.81 DEBILITY: ICD-10-CM

## 2021-07-28 DIAGNOSIS — E11.65 TYPE 2 DIABETES MELLITUS WITH HYPERGLYCEMIA, WITH LONG-TERM CURRENT USE OF INSULIN (HCC): ICD-10-CM

## 2021-07-28 DIAGNOSIS — Z79.4 TYPE 2 DIABETES MELLITUS WITH HYPERGLYCEMIA, WITH LONG-TERM CURRENT USE OF INSULIN (HCC): ICD-10-CM

## 2021-07-28 DIAGNOSIS — I48.91 ATRIAL FIBRILLATION, UNSPECIFIED TYPE (HCC): ICD-10-CM

## 2021-07-28 DIAGNOSIS — S72.001D CLOSED FRACTURE OF RIGHT HIP WITH ROUTINE HEALING, SUBSEQUENT ENCOUNTER: ICD-10-CM

## 2021-07-28 DIAGNOSIS — D62 ACUTE BLOOD LOSS ANEMIA: ICD-10-CM

## 2021-07-28 DIAGNOSIS — N18.30 STAGE 3 CHRONIC KIDNEY DISEASE, UNSPECIFIED WHETHER STAGE 3A OR 3B CKD (HCC): ICD-10-CM

## 2021-07-28 DIAGNOSIS — I50.32 CHRONIC DIASTOLIC CHF (CONGESTIVE HEART FAILURE) (HCC): ICD-10-CM

## 2021-07-28 DIAGNOSIS — T81.49XA POSTOPERATIVE WOUND INFECTION OF RIGHT HIP: Primary | ICD-10-CM

## 2021-07-28 PROCEDURE — 99316 NF DSCHRG MGMT 30 MIN+: CPT | Performed by: FAMILY MEDICINE

## 2021-07-28 NOTE — PROGRESS NOTES
[facility]  [address]  [phone]  [address] [place] [address]: [facility]  [place]: 31: SNF/Short Term Rehab    NAME: Zaheer Mclain  AGE: 80 y o  SEX: female  DATE OF ADMISSION: 7/1/21 DATE OF DISCHARGE:7/29/21 DISCHARGE DISPOSITION: [facility]    Reason for admission: Patient was admitted from [facility] for rehabilitation after hospitalization for right hip postoperative wound infection      Admission Diagnoses:   Postoperative wound infection of right hip  Completed 6 week course of daptomycin  LUE PICC removed at ID office on 7/28  Wound cultures inpatient +staph aureus  Continue scheduled acetaminophen 500mg TID for pain control  PRN tramadol discontinued (no use in 72+ hours)    CKD (chronic kidney disease) stage 3, GFR 30-59 ml/min ([facility])  Baseline creatinine 1 00  Was 1 15 on 7/26- recommend repeat BMP in one week    Acute blood loss anemia  In setting of recent right hip fracture s/p surgical repair  CBC 7/26 WNL  Continue ferrous sulfate    Chronic diastolic CHF (congestive heart failure) ([facility])  Examines euvolemic overall prior to discharge  Continue to monitor weights and volume status closely  Continue lasix + KCl, metoprolol, lisinopril    Type 2 diabetes mellitus, with long-term current use of insulin ([facility])  Most recent A1c 8 6  Glucose trending: fasting ; prelunch 226-430; predinner 108-321; -205  Continue accuchecks QID  Lantus adjusted to 15u SQ QAM, 30u QHS due to persistent hyperglycemia during SNF stay  Continue Novolog 3-6-10-0  Continue Tradjenta 5mg daily    A-fib ([address] )  Continue metoprolol; goal HR<100  Continue anticoagulation with eliquis    Closed right hip fracture ([address] )  S/p ORIF on 5/16/21  With postoperative wound infection; management as above  Continue scheduled acetaminophen for pain control    Debility  Multifactorial  Completed SNF rehab  Continue PT/OT through home health at discharge  Supportive care, nutritional support, ADL support  Fall precautions  Management of acute and chronic medical conditions as outlined    Additional Problems: None  Discharge Diagnoses: Same as above    Course of stay: [place] [facility] [place] due to above  Significant events during the stay include: None  The patient participated in PT/OT  Labs and testing performed during stay:  CBC (7/26/21): Hgb 12 5 Hct 38 1 WBC 6 5 Plt 218  Creat (7/26/21): 1 15    New Medications:   -Acetaminophen 500mg TID  Medication Changes: None  Discontinued Medications:  -Tramadol  -Lorazepam  Discharge Medications: See discharge medication list which was reviewed and updated in Epic to reflect most current SNF orders      Current Outpatient Medications:     acetaminophen (TYLENOL) 500 mg tablet, Take 500 mg by mouth 3 (three) times a day , Disp: , Rfl:     insulin glargine (LANTUS SOLOSTAR) 100 units/mL injection pen, Inject 10-30 Units under the skin every 12 (twelve) hours 15u in AM, 30u QHS, Disp: , Rfl:     apixaban (ELIQUIS) 2 5 mg, Take 2 5 mg by mouth 2 (two) times a day, Disp: , Rfl:     aspirin 81 mg chewable tablet, Chew 81 mg daily, Disp: , Rfl:     benzocaine-menthol (CEPACOL) 15-3 6 mg per lozenge, 15 mg by Transmucosal route every 2 (two) hours as needed, Disp: , Rfl:     Calcium Carb-Cholecalciferol (Calcium-Vitamin D) 600-400 MG-UNIT TABS, Take 1 tablet by mouth daily, Disp: , Rfl:     Cholecalciferol (Vitamin D3) 50 MCG (2000 UT) capsule, Take 2,000 Units by mouth daily, Disp: , Rfl:     clopidogrel (PLAVIX) 75 mg tablet, Take 75 mg by mouth daily, Disp: , Rfl:     ferrous sulfate 325 (65 Fe) mg tablet, Take 325 mg by mouth daily with breakfast, Disp: , Rfl:     fluticasone (FLONASE) 50 mcg/act nasal spray, 1 spray into each nostril daily, Disp: , Rfl:     furosemide (LASIX) 40 mg tablet, Take 40 mg by mouth daily, Disp: , Rfl:     glucose blood (True Metrix Blood Glucose Test) test strip, Use test to test blood sugar 4 x daily 7am, 12pm, 5pm, 8pm  , Disp: 50 strip, Rfl: 1    insulin aspart (NovoLOG FlexPen) 100 UNIT/ML injection pen, Inject 3-10 Units under the skin 3 (three) times a day with meals 3u breakfast, 6u lunch, 10u dinner, Disp: , Rfl:     LACTULOSE PO, Take 30 mL by mouth daily as needed, Disp: , Rfl:     LINAGLIPTIN PO, Take 5 mg by mouth daily, Disp: , Rfl:     lisinopril (ZESTRIL) 5 mg tablet, Take 5 mg by mouth daily, Disp: , Rfl:     loperamide (IMODIUM A-D) 2 MG tablet, Take 2 mg by mouth 4 (four) times a day as needed, Disp: , Rfl:     Magnesium Oxide 250 MG TABS, Take 1 tablet (250 mg total) by mouth daily, Disp: , Rfl: 0    metoprolol succinate (TOPROL-XL) 25 mg 24 hr tablet, Take 75 mg by mouth daily , Disp: , Rfl:     miconazole (MICOTIN) 2 % powder, Apply 1 application topically 2 (two) times a day, Disp: , Rfl:     Multiple Vitamin (multivitamin) tablet, Take 1 tablet by mouth daily, Disp: , Rfl:     pantoprazole (PROTONIX) 40 mg tablet, Take 40 mg by mouth daily, Disp: , Rfl:     potassium chloride (K-DUR,KLOR-CON) 20 mEq tablet, Take 20 mEq by mouth daily, Disp: , Rfl:     rosuvastatin (CRESTOR) 20 MG tablet, Take 20 mg by mouth daily, Disp: , Rfl:     Safety Lancets MISC, Use 4 (four) times a day Use to test blood sugars 4 x  A day at 7am, 12pm, 5pm, 8pm , Disp: 100 each, Rfl: 1    senna-docusate sodium (Senokot S) 8 6-50 mg per tablet, Take 1 tablet by mouth 2 (two) times a day, Disp: , Rfl:     sodium chloride (OCEAN) 0 65 % nasal spray, 1 spray into each nostril every 4 (four) hours as needed for congestion, Disp: , Rfl:     Status at time of discharge: Stable    Today's Visit: 7/28/21    Subjective:Patient evaluated for STR follow up and discharge visit  Saw ID for follow up of right hip postoperative wound infection earlier today; note and recommendations reviewed; SUNNY PICC discontinued in ID office   Completed 6 weeks of IV daptomycin; no fever or chills  Right hip pain well controlled on scheduled acetaminophen; has not used PRN tramadol- will discontinue  Has not used PRN lorazepam throughout SNF stay- will discontinue  Vitals:159lb 128/74 73 18 85 96%RA    Exam: Physical Exam  Vitals and nursing note reviewed  Constitutional:       General: She is awake  She is not in acute distress  Appearance: She is well-developed, well-groomed and overweight  She is not ill-appearing, toxic-appearing or diaphoretic  HENT:      Head: Normocephalic and atraumatic  Right Ear: External ear normal       Left Ear: External ear normal       Nose: No rhinorrhea  Mouth/Throat:      Mouth: Mucous membranes are moist       Pharynx: Oropharynx is clear  Eyes:      General: No scleral icterus  Right eye: No discharge  Left eye: No discharge  Conjunctiva/sclera: Conjunctivae normal    Pulmonary:      Effort: Pulmonary effort is normal  No respiratory distress  Abdominal:      General: There is no distension  Palpations: Abdomen is soft  Musculoskeletal:         General: No deformity  Cervical back: No rigidity  Right lower leg: Edema present  Left lower leg: Edema (trace pedal R>L) present  Skin:     General: Skin is warm and dry  Capillary Refill: Capillary refill takes less than 2 seconds  Coloration: Skin is not jaundiced or pale  Neurological:      Mental Status: She is alert  Mental status is at baseline  Cranial Nerves: No cranial nerve deficit, dysarthria or facial asymmetry  Psychiatric:         Attention and Perception: Attention and perception normal          Mood and Affect: Mood and affect normal          Speech: Speech normal          Behavior: Behavior is cooperative           Discussion with patient/family and further instructions:  -Fall precautions  -Monitor for signs/symptoms of infection  -Medication list was reviewed and signed; updated in Epic to reflect most current SNF orders  -DME form was completed  -Face to face form for home health was completed    Follow-up Recommendations: Please follow-up with your primary care physician within 7-10 days of discharge to review medication changes and current status  Discharge summary forwarded to PCP through Epic  Problem List Follow-up Recommendations:  -See above    I have spent 35 minutes with Patient  today in which greater than 50% of this time was spent in counseling/coordination of care regarding Intructions for management, Patient and family education, Impressions and coordination of discharge care with social work and nursing      [facility], DO  7/28/21

## 2021-08-05 DIAGNOSIS — E11.00 TYPE 2 DIABETES MELLITUS WITH HYPEROSMOLARITY WITHOUT COMA, WITH LONG-TERM CURRENT USE OF INSULIN (HCC): Primary | ICD-10-CM

## 2021-08-05 DIAGNOSIS — Z79.4 TYPE 2 DIABETES MELLITUS WITH HYPEROSMOLARITY WITHOUT COMA, WITH LONG-TERM CURRENT USE OF INSULIN (HCC): Primary | ICD-10-CM

## 2021-08-05 RX ORDER — CALCIUM CITRATE/VITAMIN D3 200MG-6.25
TABLET ORAL
Qty: 50 STRIP | Refills: 1 | Status: SHIPPED | OUTPATIENT
Start: 2021-08-05 | End: 2021-08-09 | Stop reason: SDUPTHER

## 2021-08-05 RX ORDER — LANCETS 26 GAUGE
EACH MISCELLANEOUS 4 TIMES DAILY
Qty: 100 EACH | Refills: 1 | Status: SHIPPED | OUTPATIENT
Start: 2021-08-05 | End: 2021-08-10 | Stop reason: SDUPTHER

## 2021-08-06 NOTE — ASSESSMENT & PLAN NOTE
Continue daptomycin; antibiotic management per ID, routine follow up as scheduled on 7/28  Wound cultures inpatient +staph aureus  Continue weekly labs while on IV antibiotics; monitor renal function closely  Continue scheduled acetaminophen 500mg TID for pain control

## 2021-08-06 NOTE — ASSESSMENT & PLAN NOTE
Most recent A1c 8 6  Continue accuchecks QID  With continued hyperglycemia increase AM lantus to 15u; continue lantus 30u QHS  Continue Novolog 3-6-10-0  Continue Tradjenta 5mg daily

## 2021-08-06 NOTE — ASSESSMENT & PLAN NOTE
Examines euvolemic overall prior to discharge  Continue to monitor weights and volume status closely  Continue lasix + KCl, metoprolol, lisinopril

## 2021-08-06 NOTE — ASSESSMENT & PLAN NOTE
Most recent A1c 8 6  Glucose trending: fasting ; prelunch 226-430; predinner 108-321; -205  Continue accuchecks QID  Lantus adjusted to 15u SQ QAM, 30u QHS due to persistent hyperglycemia during SNF stay  Continue Novolog 3-6-10-0  Continue Tradjenta 5mg daily

## 2021-08-06 NOTE — ASSESSMENT & PLAN NOTE
Baseline creatinine 1 00  Continue to monitor creatinine weekly while on IV antibiotics; creatinine 7/19 at baseline

## 2021-08-06 NOTE — ASSESSMENT & PLAN NOTE
S/p ORIF on 5/16/21  With postoperative wound infection; management as above  Continue scheduled acetaminophen for pain control

## 2021-08-06 NOTE — ASSESSMENT & PLAN NOTE
Completed 6 week course of daptomycin  LUE PICC removed at ID office on 7/28  Wound cultures inpatient +staph aureus  Continue scheduled acetaminophen 500mg TID for pain control  PRN tramadol discontinued (no use in 72+ hours)

## 2021-08-09 DIAGNOSIS — E11.00 TYPE 2 DIABETES MELLITUS WITH HYPEROSMOLARITY WITHOUT COMA, WITH LONG-TERM CURRENT USE OF INSULIN (HCC): ICD-10-CM

## 2021-08-09 DIAGNOSIS — Z79.4 TYPE 2 DIABETES MELLITUS WITH HYPEROSMOLARITY WITHOUT COMA, WITH LONG-TERM CURRENT USE OF INSULIN (HCC): ICD-10-CM

## 2021-08-09 NOTE — TELEPHONE ENCOUNTER
Fax came in for the True Metric Glucose Test Strips refill  Order was placed 8/5/2021 but to the wrong pharmacy

## 2021-08-10 DIAGNOSIS — E11.00 TYPE 2 DIABETES MELLITUS WITH HYPEROSMOLARITY WITHOUT COMA, WITH LONG-TERM CURRENT USE OF INSULIN (HCC): ICD-10-CM

## 2021-08-10 DIAGNOSIS — Z79.4 TYPE 2 DIABETES MELLITUS WITH HYPEROSMOLARITY WITHOUT COMA, WITH LONG-TERM CURRENT USE OF INSULIN (HCC): ICD-10-CM

## 2021-08-10 RX ORDER — CALCIUM CITRATE/VITAMIN D3 200MG-6.25
TABLET ORAL
Qty: 50 STRIP | Refills: 1 | Status: SHIPPED | OUTPATIENT
Start: 2021-08-10

## 2021-08-11 RX ORDER — LANCETS 26 GAUGE
EACH MISCELLANEOUS 4 TIMES DAILY
Qty: 100 EACH | Refills: 1 | Status: SHIPPED | OUTPATIENT
Start: 2021-08-11

## 2021-11-21 DIAGNOSIS — L89.609 PRESSURE INJURY OF SKIN OF HEEL, UNSPECIFIED INJURY STAGE, UNSPECIFIED LATERALITY: ICD-10-CM

## 2021-11-21 DIAGNOSIS — Z43.3 COLOSTOMY CARE (HCC): Primary | ICD-10-CM

## 2021-11-24 RX ORDER — OSTOMY SUPPLY
BOX MISCELLANEOUS
Qty: 50 EACH | Refills: 3 | Status: SHIPPED | OUTPATIENT
Start: 2021-11-24